# Patient Record
Sex: MALE | Race: WHITE | NOT HISPANIC OR LATINO | Employment: UNEMPLOYED | URBAN - METROPOLITAN AREA
[De-identification: names, ages, dates, MRNs, and addresses within clinical notes are randomized per-mention and may not be internally consistent; named-entity substitution may affect disease eponyms.]

---

## 2022-10-07 ENCOUNTER — TELEPHONE (OUTPATIENT)
Dept: ORTHOPEDICS | Facility: CLINIC | Age: 52
End: 2022-10-07
Payer: COMMERCIAL

## 2022-10-07 ENCOUNTER — HOSPITAL ENCOUNTER (EMERGENCY)
Facility: HOSPITAL | Age: 52
Discharge: HOME | End: 2022-10-07
Attending: EMERGENCY MEDICINE
Payer: COMMERCIAL

## 2022-10-07 VITALS
TEMPERATURE: 98 F | SYSTOLIC BLOOD PRESSURE: 125 MMHG | DIASTOLIC BLOOD PRESSURE: 70 MMHG | HEART RATE: 67 BPM | RESPIRATION RATE: 16 BRPM | OXYGEN SATURATION: 98 %

## 2022-10-07 DIAGNOSIS — S61.329A: Primary | ICD-10-CM

## 2022-10-07 DIAGNOSIS — S62.639B OPEN FRACTURE OF TUFT OF DISTAL PHALANX OF FINGER: ICD-10-CM

## 2022-10-07 PROBLEM — S61.319A NAILBED LACERATION, FINGER, INITIAL ENCOUNTER: Status: ACTIVE | Noted: 2022-10-07

## 2022-10-07 LAB
HCV AB SERPL QL IA: REACTIVE
HIV 1+2 AB+HIV1 P24 AG SERPL QL IA: NORMAL

## 2022-10-07 PROCEDURE — 63600175 PHARM REV CODE 636 W HCPCS: Performed by: EMERGENCY MEDICINE

## 2022-10-07 PROCEDURE — 96366 THER/PROPH/DIAG IV INF ADDON: CPT | Mod: 59

## 2022-10-07 PROCEDURE — 63600175 PHARM REV CODE 636 W HCPCS

## 2022-10-07 PROCEDURE — 11760 PR RECONSTRUC OF NAIL BED: ICD-10-PCS | Mod: F7,,, | Performed by: ORTHOPAEDIC SURGERY

## 2022-10-07 PROCEDURE — 96372 THER/PROPH/DIAG INJ SC/IM: CPT | Mod: 59 | Performed by: EMERGENCY MEDICINE

## 2022-10-07 PROCEDURE — 87389 HIV-1 AG W/HIV-1&-2 AB AG IA: CPT

## 2022-10-07 PROCEDURE — 25000003 PHARM REV CODE 250

## 2022-10-07 PROCEDURE — 11760 REPAIR OF NAIL BED: CPT | Mod: F7,,, | Performed by: ORTHOPAEDIC SURGERY

## 2022-10-07 PROCEDURE — 96365 THER/PROPH/DIAG IV INF INIT: CPT | Mod: 59

## 2022-10-07 PROCEDURE — 99285 EMERGENCY DEPT VISIT HI MDM: CPT | Mod: ,,, | Performed by: EMERGENCY MEDICINE

## 2022-10-07 PROCEDURE — 99284 EMERGENCY DEPT VISIT MOD MDM: CPT | Mod: 25

## 2022-10-07 PROCEDURE — 99285 PR EMERGENCY DEPT VISIT,LEVEL V: ICD-10-PCS | Mod: ,,, | Performed by: EMERGENCY MEDICINE

## 2022-10-07 PROCEDURE — 11760 REPAIR OF NAIL BED: CPT | Mod: F2

## 2022-10-07 PROCEDURE — 99283 EMERGENCY DEPT VISIT LOW MDM: CPT | Mod: 25,,, | Performed by: ORTHOPAEDIC SURGERY

## 2022-10-07 PROCEDURE — 86803 HEPATITIS C AB TEST: CPT

## 2022-10-07 PROCEDURE — 87522 HEPATITIS C REVRS TRNSCRPJ: CPT

## 2022-10-07 PROCEDURE — 87522 HEPATITIS C REVRS TRNSCRPJ: CPT | Mod: 91

## 2022-10-07 PROCEDURE — 96367 TX/PROPH/DG ADDL SEQ IV INF: CPT

## 2022-10-07 PROCEDURE — 99283 PR EMERGENCY DEPT VISIT,LEVEL III: ICD-10-PCS | Mod: 25,,, | Performed by: ORTHOPAEDIC SURGERY

## 2022-10-07 PROCEDURE — 25000003 PHARM REV CODE 250: Performed by: EMERGENCY MEDICINE

## 2022-10-07 RX ORDER — MORPHINE SULFATE 15 MG/1
15 TABLET ORAL
Status: COMPLETED | OUTPATIENT
Start: 2022-10-07 | End: 2022-10-07

## 2022-10-07 RX ORDER — CEFAZOLIN SODIUM/D5W 2 G/100 ML
2 PLASTIC BAG, INJECTION (ML) INTRAVENOUS ONCE
Status: COMPLETED | OUTPATIENT
Start: 2022-10-07 | End: 2022-10-07

## 2022-10-07 RX ORDER — MORPHINE SULFATE 2 MG/ML
6 INJECTION, SOLUTION INTRAMUSCULAR; INTRAVENOUS
Status: COMPLETED | OUTPATIENT
Start: 2022-10-07 | End: 2022-10-07

## 2022-10-07 RX ORDER — OXYCODONE AND ACETAMINOPHEN 5; 325 MG/1; MG/1
1 TABLET ORAL EVERY 4 HOURS PRN
Qty: 12 TABLET | Refills: 0 | Status: SHIPPED | OUTPATIENT
Start: 2022-10-07 | End: 2022-11-08

## 2022-10-07 RX ORDER — SULFAMETHOXAZOLE AND TRIMETHOPRIM 800; 160 MG/1; MG/1
1 TABLET ORAL 2 TIMES DAILY
Qty: 14 TABLET | Refills: 0 | Status: SHIPPED | OUTPATIENT
Start: 2022-10-07 | End: 2022-10-14

## 2022-10-07 RX ORDER — IBUPROFEN 400 MG/1
400 TABLET ORAL
Status: COMPLETED | OUTPATIENT
Start: 2022-10-07 | End: 2022-10-07

## 2022-10-07 RX ADMIN — MORPHINE SULFATE 6 MG: 2 INJECTION, SOLUTION INTRAMUSCULAR; INTRAVENOUS at 09:10

## 2022-10-07 RX ADMIN — IBUPROFEN 400 MG: 400 TABLET, FILM COATED ORAL at 09:10

## 2022-10-07 RX ADMIN — DEXTROSE 2 G: 50 INJECTION, SOLUTION INTRAVENOUS at 12:10

## 2022-10-07 RX ADMIN — MORPHINE SULFATE 15 MG: 15 TABLET ORAL at 10:10

## 2022-10-07 RX ADMIN — VANCOMYCIN HYDROCHLORIDE 1500 MG: 1.5 INJECTION, POWDER, LYOPHILIZED, FOR SOLUTION INTRAVENOUS at 01:10

## 2022-10-07 NOTE — ED TRIAGE NOTES
"29 " PTA cut 3rd , 4th, 5th left hand fingers w/ a skill saw. TIPs of fingers cut.States tetanus is UTD.  Bleeding controlled.    "

## 2022-10-07 NOTE — PROCEDURES
Robert Dial is a 51 y.o. male patient.    Temp: 97.8 °F (36.6 °C) (10/07/22 1454)  Pulse: 67 (10/07/22 1454)  Resp: 16 (10/07/22 1454)  BP: 125/70 (10/07/22 1454)  SpO2: 98 % (10/07/22 1454)       Orthopedic Injury Treatment    Date/Time: 10/7/2022 1:44 PM  Performed by: Carl Carias MD  Authorized by: Carl Carias MD     Location procedure was performed:  [facility]  Assisting Provider:  Cyril Fisher MD  Pre-operative diagnosis:  Nail bed injury left small finger  Post-operative diagnosis:  Nail bed laceration left small finger  Consent Done?:  Yes  Universal Protocol:     Verbal consent obtained?: Yes      Risks and benefits: Risks, benefits and alternatives were discussed      Patient states understanding of procedure being performed: Yes      Patient's understanding of procedure matches consent: Yes      Procedure consent matches procedure scheduled: Yes      Relevant documents present and verified: Yes      Test results available and properly labeled: Yes      Site marked: Yes      Imaging studies available: Yes      Time Out: Immediately prior to the procedure a time out was called    Injury:     Injury location:  Finger    Location details:  Left long finger      Pre-procedure assessment:     Distal perfusion: normal      Neurological function: normal      Range of motion: normal      Local anesthesia used?: Yes      Anesthesia:  Digital block    Local anesthetic:  Lidocaine 1% without epinephrine    Patient sedated?: No        Selections made in this section will also lock the Injury type section above.:     Technical Procedures Used:  Nailbed repair    Complications: No      Estimated blood loss (mL):  5    Specimens: No      Implants: No    Post-procedure assessment:     Neurovascular status: Neurovascularly intact      Distal perfusion: normal      Neurological function: normal      Range of motion: normal      Patient tolerance:  Patient tolerated the procedure well  with no immediate complications  Orthopedic Injury Treatment    Date/Time: 10/7/2022 1:46 PM  Performed by: Cyril Fisher MD  Authorized by: Cyril Fisher MD     Location procedure was performed:  [facility]  Injury:     Injury location:  Finger    Location details:  Left ring finger    Injury type:  Soft tissue      Pre-procedure assessment:     Neurovascular status: Neurovascularly intact      Distal perfusion: normal      Neurological function: normal      Range of motion: normal      Local anesthesia used?: Yes      Anesthesia:  Digital block    Local anesthetic:  Lidocaine 1% without epinephrine      Selections made in this section will also lock the Injury type section above.:     Technical Procedures Used:  Nailbed repair    Complications: No    Post-procedure assessment:     Neurovascular status: Neurovascularly intact      Distal perfusion: normal      Neurological function: normal      Range of motion: normal      Patient tolerance:  Patient tolerated the procedure well with no immediate complications  Orthopedic Injury Treatment    Date/Time: 10/7/2022 1:48 PM  Performed by: Cyril Fisher MD  Authorized by: Cyril Fisher MD     Location procedure was performed:  [facility]  Injury:     Injury location:  Finger    Location details:  Left little finger    Injury type:  Soft tissue      Pre-procedure assessment:     Neurovascular status: Neurovascularly intact      Distal perfusion: normal      Neurological function: normal      Range of motion: normal      Local anesthesia used?: Yes      Local anesthetic:  Lidocaine 1% without epinephrine      Selections made in this section will also lock the Injury type section above.:     Technical Procedures Used:  Nailbed repair    10/10/2022        =================    I was present or immediately available for all critical portions of procedure     Left hand versus saw   Middle finger open tuft fracture with nail bed laceration   Ring  finger with nail bed laceration   Small finger open tuft fracture with nail bed laceration    Patient went debridement irrigation of open fracture + nail bed repair x 3 fingers.    Nailbed laceration, finger, initial encounter  Robert Dail is a LHD 51 y.o. male with no significant PMH presenting with lacerations, nailbed injuries of 3, 4, 5th fingers following a skillsaw injury. Also with associated 3rd, 5th finger tuft fracture. He is neurovascularly intact. No tendon involvement appreciated.     Procedure Note: 3rd, 4th, 5th finger nail bed repair  Patient was explained risks, benefits, and alternatives to treatment and verbalized consent to proceed. Time out was performed and patient name, , site, and procedure were confirmed. 15 cc of 1% lidocaine was injected for a digital block after local cleaning with alcohol swabs. Distal extremity was cleansed with betadine and draped with blue towels. Nails were removed from nail bed using hemostat. The lacerations were then thoroughly irrigated with 4L of normal saline and betadine. At this point, the 3rd, 4th, 5th finger nailbed wounds were primarily closed using 4-0 gut chromic. The nailbed eponychial folds were stented open with xeroform. 3-0 nylon simple sutures were used to repair adjacent skin lacerations. The patient tolerated the procedure well with no complications.  Blood loss was minimal.     - Xeroform covering superficial skin tears  - 3rd, 4th, 5th fingers dressed with soft dressing  - NWB LUE  - Ancef and vanc given in ED  - Tetanus UTD  - Discharge with PO Bactrim  - Pain control multimodal  - [facility] in 1 week (patient will be contacted with appointment details)

## 2022-10-07 NOTE — CONSULTS
[facility] Emergency Dept  Orthopedics  Consult Note    Patient Name: Robert Dial  MRN: [id number]  Admission Date: 10/7/2022  Hospital Length of Stay: 0 days  Attending Provider: Madisyn Dolan MD  Primary Care Provider: Primary Doctor No        Inpatient consult to Orthopedic Surgery  Consult performed by: Cyril Fisher MD  Consult ordered by: Madisyn Dolan MD        Subjective:     Principal Problem:Nailbed laceration, finger, initial encounter    Chief Complaint:   Chief Complaint   Patient presents with    Laceration     Left hand injury while at work - cut his hand with a skill saw - laceration noted clean gauze placed to middle digit         HPI: 51 y.o. male with no significant past medical history who presents to the ED with a complaint of left hand injury this morning. The patient was at work when he cut middle, ring, and small fingers on his left hand with a Skill saw. Denies numbness and tingling. He is up to date with his tetanus shot. Received ancef and vanc in the ED. The patient is left handed.      History reviewed. No pertinent past medical history.    History reviewed. No pertinent surgical history.    Review of patient's allergies indicates:  No Known Allergies    Current Facility-Administered Medications   Medication    vancomycin 1.5 g in dextrose 5 % 250 mL IVPB (ready to mix)     Current Outpatient Medications   Medication Sig    sulfamethoxazole-trimethoprim 800-160mg (BACTRIM DS) 800-160 mg Tab Take 1 tablet by mouth 2 (two) times daily. for 7 days     Family History    None       Tobacco Use    Smoking status: Every Day     Types: Cigarettes    Smokeless tobacco: Never   Substance and Sexual Activity    Alcohol use: Never    Drug use: Never    Sexual activity: Not on file     ROS  Constitutional: negative for fevers  Eyes: negative visual changes  ENT: negative for hearing loss  Respiratory: negative for dyspnea  Cardiovascular: negative for chest pain  Gastrointestinal:  negative for abdominal pain  Genitourinary: negative for dysuria  Neurological: negative for headaches  Behavioral/Psych: negative for hallucinations  Endocrine: negative for temperature intolerance    Objective:     Vital Signs (Most Recent):  Temp: 98 °F (36.7 °C) (10/07/22 1020)  Pulse: 66 (10/07/22 1020)  Resp: 18 (10/07/22 1020)  BP: 135/75 (10/07/22 1020)  SpO2: 100 % (10/07/22 0916) Vital Signs (24h Range):  Temp:  [98 °F (36.7 °C)] 98 °F (36.7 °C)  Pulse:  [66-82] 66  Resp:  [18-22] 18  SpO2:  [100 %] 100 %  BP: (118-135)/(66-75) 135/75           There is no height or weight on file to calculate BMI.      Intake/Output Summary (Last 24 hours) at 10/7/2022 1317  Last data filed at 10/7/2022 1309  Gross per 24 hour   Intake 100 ml   Output --   Net 100 ml       Ortho/SPM Exam  General:  no acute distress, appears stated age   Neuro: alert and oriented x3  Psych: normal mood  Head: normocephalic, atraumatic.  Eyes: no scleral icterus  Mouth: moist mucous membranes  Cardiovascular: extremities warm and well perfused  Lungs: breathing comfortably, equal chest rise bilat  Skin: clean, dry, intact (any exceptions noted in below musculoskeletal exam)    MSK:  RUE:  - Skin intact throughout, no open wounds  - No swelling  - No ecchymosis, erythema, or signs of cellulitis  - NonTTP throughout  - AROM and PROM of the shoulder, elbow, wrist, and hand intact without pain  - Axillary/AIN/PIN/Radial/Median/Ulnar Nerves assessed in isolation without deficit  - SILT throughout  - Compartments soft  - Radial artery palpated   - Capillary Refill <3s    LUE:  - Lacerations through 3, 4, 5 P3 with associated nailbed injuries  - No swelling  - No ecchymosis, erythema, or signs of cellulitis  - NonTTP throughout  - AROM and PROM of the shoulder, elbow, wrist, and hand intact without pain  - Full flexion/extension at DIP  - Axillary/AIN/PIN/Radial/Median/Ulnar Nerves assessed in isolation without deficit  - SILT throughout  -  Compartments soft  - Radial artery palpated   - Capillary Refill <3s    RLE:  - Skin intact throughout, no open wounds  - No swelling  - No ecchymosis, erythema, or signs of cellulitis  - NonTTP throughout  - AROM and PROM of the hip, knee, ankle, and foot intact without pain  - TA/EHL/Gastroc/FHL assessed in isolation without deficit  - SILT throughout  - Compartments soft  - DP and PT palpated  - Capillary Refill <3s  - Negative Log roll  - Negative [facility]    LLE:  - Skin intact throughout, no open wounds  - No swelling  - No ecchymosis, erythema, or signs of cellulitis  - NonTTP throughout  - AROM and PROM of the hip, knee, ankle, and foot intact without pain  - TA/EHL/Gastroc/FHL assessed in isolation without deficit  - SILT throughout  - Compartments soft  - DP and PT palpated  - Capillary Refill <3s  - Negative Log roll  - Negative [facility]        Significant Labs: None    Significant Imaging: I have reviewed and interpreted all pertinent imaging results/findings.  Tuft fracture of 3rd and 5th fingers. Soft tissue defects fingers 3, 4, 5    Assessment/Plan:     * Nailbed laceration, finger, initial encounter  Robert Dial is a LHD 51 y.o. male with no significant PMH presenting with lacerations, nailbed injuries of 3, 4, 5th fingers following a skillsaw injury. Also with associated 3rd, 5th finger tuft fracture. He is neurovascularly intact. No tendon involvement appreciated.    Procedure Note: 3rd, 4th, 5th finger nail bed repair  Patient was explained risks, benefits, and alternatives to treatment and verbalized consent to proceed. Time out was performed and patient name, , site, and procedure were confirmed. 15 cc of 1% lidocaine was injected for a digital block after local cleaning with alcohol swabs. Distal extremity was cleansed with betadine and draped with blue towels. Nails were removed from nail bed using hemostat. The lacerations were then thoroughly irrigated with 4L of normal saline  and betadine. At this point, the 3rd, 4th, 5th finger nailbed wounds were primarily closed using 4-0 gut chromic. The nailbed eponychial folds were stented open with xeroform. 3-0 nylon simple sutures were used to repair adjacent skin lacerations. The patient tolerated the procedure well with no complications.  Blood loss was minimal.    - Xeroform covering superficial skin tears  - 3rd, 4th, 5th fingers dressed with soft dressing  - NWB LUE  - Ancef and vanc given in ED  - Tetanus UTD  - Discharge with PO Bactrim  - Pain control multimodal  - [facility]-up with [facility] in 1 week (patient will be contacted with appointment details)          Cyril Fisher MD  Orthopedics  [facility] - [facility] Dept

## 2022-10-07 NOTE — SUBJECTIVE & OBJECTIVE
History reviewed. No pertinent past medical history.    History reviewed. No pertinent surgical history.    Review of patient's allergies indicates:  No Known Allergies    Current Facility-Administered Medications   Medication    vancomycin 1.5 g in dextrose 5 % 250 mL IVPB (ready to mix)     Current Outpatient Medications   Medication Sig    sulfamethoxazole-trimethoprim 800-160mg (BACTRIM DS) 800-160 mg Tab Take 1 tablet by mouth 2 (two) times daily. for 7 days     Family History    None       Tobacco Use    Smoking status: Every Day     Types: Cigarettes    Smokeless tobacco: Never   Substance and Sexual Activity    Alcohol use: Never    Drug use: Never    Sexual activity: Not on file     ROS  Constitutional: negative for fevers  Eyes: negative visual changes  ENT: negative for hearing loss  Respiratory: negative for dyspnea  Cardiovascular: negative for chest pain  Gastrointestinal: negative for abdominal pain  Genitourinary: negative for dysuria  Neurological: negative for headaches  Behavioral/Psych: negative for hallucinations  Endocrine: negative for temperature intolerance    Objective:     Vital Signs (Most Recent):  Temp: 98 °F (36.7 °C) (10/07/22 1020)  Pulse: 66 (10/07/22 1020)  Resp: 18 (10/07/22 1020)  BP: 135/75 (10/07/22 1020)  SpO2: 100 % (10/07/22 0916) Vital Signs (24h Range):  Temp:  [98 °F (36.7 °C)] 98 °F (36.7 °C)  Pulse:  [66-82] 66  Resp:  [18-22] 18  SpO2:  [100 %] 100 %  BP: (118-135)/(66-75) 135/75           There is no height or weight on file to calculate BMI.      Intake/Output Summary (Last 24 hours) at 10/7/2022 1317  Last data filed at 10/7/2022 1309  Gross per 24 hour   Intake 100 ml   Output --   Net 100 ml       Ortho/SPM Exam  General:  no acute distress, appears stated age   Neuro: alert and oriented x3  Psych: normal mood  Head: normocephalic, atraumatic.  Eyes: no scleral icterus  Mouth: moist mucous membranes  Cardiovascular: extremities warm and well perfused  Lungs:  breathing comfortably, equal chest rise bilat  Skin: clean, dry, intact (any exceptions noted in below musculoskeletal exam)    MSK:  RUE:  - Skin intact throughout, no open wounds  - No swelling  - No ecchymosis, erythema, or signs of cellulitis  - NonTTP throughout  - AROM and PROM of the shoulder, elbow, wrist, and hand intact without pain  - Axillary/AIN/PIN/Radial/Median/Ulnar Nerves assessed in isolation without deficit  - SILT throughout  - Compartments soft  - Radial artery palpated   - Capillary Refill <3s    LUE:  - Lacerations through 3, 4, 5 P3 with associated nailbed injuries  - No swelling  - No ecchymosis, erythema, or signs of cellulitis  - NonTTP throughout  - AROM and PROM of the shoulder, elbow, wrist, and hand intact without pain  - Full flexion/extension at DIP  - Axillary/AIN/PIN/Radial/Median/Ulnar Nerves assessed in isolation without deficit  - SILT throughout  - Compartments soft  - Radial artery palpated   - Capillary Refill <3s    RLE:  - Skin intact throughout, no open wounds  - No swelling  - No ecchymosis, erythema, or signs of cellulitis  - NonTTP throughout  - AROM and PROM of the hip, knee, ankle, and foot intact without pain  - TA/EHL/Gastroc/FHL assessed in isolation without deficit  - SILT throughout  - Compartments soft  - DP and PT palpated  - Capillary Refill <3s  - Negative Log roll  - Negative [facility]    LLE:  - Skin intact throughout, no open wounds  - No swelling  - No ecchymosis, erythema, or signs of cellulitis  - NonTTP throughout  - AROM and PROM of the hip, knee, ankle, and foot intact without pain  - TA/EHL/Gastroc/FHL assessed in isolation without deficit  - SILT throughout  - Compartments soft  - DP and PT palpated  - Capillary Refill <3s  - Negative Log roll  - Negative [facility]        Significant Labs: None    Significant Imaging: I have reviewed and interpreted all pertinent imaging results/findings.  Tuft fracture of 3rd and 5th fingers. Soft tissue  defects fingers 3, 4, 5

## 2022-10-07 NOTE — ED PROVIDER NOTES
Encounter Date: 10/7/2022    SCRIBE #1 NOTE: I, Stephanie Lucas, am scribing for, and in the presence of,  Madisyn Dolan MD. I have scribed the following portions of the note - Other sections scribed: HPI, ROS.     History     Chief Complaint   Patient presents with    Laceration     Left hand injury while at work - cut his hand with a skill saw - laceration noted clean gauze placed to middle digit      Time patient was seen by the provider: 9:25 AM      The patient is a 51 y.o. male with no significant past medical history who presents to the ED with a complaint of a hand injury onset this morning. The patient was at work when he cut the 3rd, 4th and 5th digits of his left hand with a saw. He is up to date with his tetanus shot. The patient is left handed.  Denies any weakness or numbness to his digits.    The history is provided by the patient and medical records. No  was used.   Review of patient's allergies indicates:  No Known Allergies  History reviewed. No pertinent past medical history.  History reviewed. No pertinent surgical history.  History reviewed. No pertinent family history.  Social History     Tobacco Use    Smoking status: Every Day     Types: Cigarettes    Smokeless tobacco: Never   Substance Use Topics    Alcohol use: Never    Drug use: Never     Review of Systems   Constitutional:  Negative for fever.   Skin:  Positive for wound (left hand). Negative for rash.   Neurological:  Negative for weakness and numbness.   All other systems reviewed and are negative.    Physical Exam     Initial Vitals [10/07/22 0916]   BP Pulse Resp Temp SpO2   118/66 82 (!) 22 98 °F (36.7 °C) 100 %      MAP       --         Physical Exam    Nursing note and vitals reviewed.  Constitutional: Vital signs are normal. He appears well-developed and well-nourished. He is not diaphoretic.  Non-toxic appearance. He does not appear ill. He appears distressed.   HENT:   Head: Normocephalic and atraumatic.    Mouth/Throat: Mucous membranes are normal. Mucous membranes are not dry.   Eyes: Conjunctivae and lids are normal.   Neck: Neck supple.   Normal range of motion.  Pulmonary/Chest: No respiratory distress.   Musculoskeletal:        Hands:       Cervical back: Normal range of motion and neck supple.      Comments: Extensive lacerations noted to patient's left 3rd 4th and 5th digits.  Third digit:  To deep, jagged lacerations to the volar aspect and tip of patient's 3rd digit extending to the nail edge but not involving the nail.  Sensation intact to LT medially and laterally and flexion and extension at distal phalanx intact  Fourth digit:  Laceration extending from middle of the nail and nailbed through the proximal nail fold and scan and the laceration extends dorsally over the distal phalanx.  Ulnar aspect of nail is gone.Sensation intact to LT medially and laterally and flexion and extension at distal phalanx intact  Fifth digit:  Multiple jagged lacerations over volar and dorsal aspect of distal phalanx.  One laceration to extends through the radial aspect of the nail and involves the nail bed with part of the nail itself of avulsed.  Sensation intact to LT medially and laterally and flexion and extension at distal phalanx intact     Neurological: He is alert and oriented to person, place, and time.   Skin: Skin is dry and intact. No pallor.   Psychiatric: He has a normal mood and affect. His speech is normal and behavior is normal.                   ED Course   Procedures  Labs Reviewed   HEPATITIS C ANTIBODY - Abnormal; Notable for the following components:       Result Value    Hepatitis C Ab Reactive (*)     All other components within normal limits    Narrative:     Release to patient->Immediate   HIV 1 / 2 ANTIBODY    Narrative:     Release to patient->Immediate   HEPATITIS C RNA, QUANTITATIVE, PCR          Imaging Results              X-Ray Hand 3 View Left (Final result)  Result time 10/07/22 11:12:17       Final result by Zafar Mondragon MD (10/07/22 11:12:17)                   Impression:      As above.      Electronically signed by: Zafar Mondragon  Date:    10/07/2022  Time:    11:12               Narrative:    EXAMINATION:  XR HAND COMPLETE 3 VIEW LEFT    CLINICAL HISTORY:  lacerations, cut with steel saw;.    TECHNIQUE:  PA, lateral, and oblique views of the left hand were performed.    COMPARISON:  None    FINDINGS:  Recent appear mildly comminuted fractures of the 3rd 5th digit distal phalangeal aziza noted.  There is overlying soft tissue irregularity suggested lacerations.  Small radiodensities in this region could represent retained foreign body/debris.    Degenerative changes are noted at the radiocarpal and thumb CMC and MCP joints.  Less postop findings additionally noted at the 2nd through 4th IP joints.                                       Medications   morphine injection 6 mg (6 mg Intramuscular Given 10/7/22 0940)   ibuprofen tablet 400 mg (400 mg Oral Given 10/7/22 0942)   morphine tablet 15 mg (15 mg Oral Given 10/7/22 1014)   ceFAZolin 2 gram in dextrose 5% 100 mL IVPB (premix) (0 g Intravenous Stopped 10/7/22 1309)   vancomycin 1.5 g in dextrose 5 % 250 mL IVPB (ready to mix) (0 mg Intravenous Stopped 10/7/22 1454)     Medical Decision Making:   History:   Old Medical Records: I decided to obtain old medical records.  Initial Assessment:   Extensive lacerations from a steel saw to patient's left hand, involving the nail, nail beds  Limited exam secondary to severe pain but patient does seem to have intact sensation, motor function and tendon function both with flexion and extension at the distal phalanx per all involved digits  Differential Diagnosis:   Finger lacerations involving nail and nail bed  Rule out open fracture  Rule out foreign body  Clinical Tests:   Radiological Study: Ordered and Reviewed  ED Management:  X-ray  Pain control  Patient reports that his tetanus is  up-to-date  Hand surgery consulted to assist with wound repair and evaluation  Other:   I have discussed this case with another health care provider.       <> Summary of the Discussion: Orthopedic Surgery        Scribe Attestation:   Scribe #1: I performed the above scribed service and the documentation accurately describes the services I performed. I attest to the accuracy of the note.      ED Course as of 10/07/22 1500   Fri Oct 07, 2022   1120 Neck x-ray findings noted.  Patient has fractures of the 3rd, 4th, 5th aziza of distal phalanx.  This suggest open fractures.  Orthopedic surgery has already ordered cefazolin 2 g IV.  In addition the x-ray suggests that there might also be retained foreign bodies.  Will defer to Ortho to address these as they are going to be performing a wound repair on the patient. [AS]   1326 Ortho has completed laceration repairs and are recommending discharge home with a prescription for Bactrim. [AS]      ED Course User Index  [AS] Madisyn Dolan MD                 I, Dr. Madisyn Dolan, personally performed the services described in this documentation. All medical record entries made by the scribe were at my direction and in my presence.  I have reviewed the chart and agree that the record reflects my personal performance and is accurate and complete. Madisyn Dolan MD.  10:59 AM 10/07/2022    Clinical Impression:   Final diagnoses:  [S61.329A] Laceration of finger of left hand with foreign body and damage to nail, unspecified finger, initial encounter (Primary)  [S62.533B] Open fracture of tuft of distal phalanx of finger      ED Disposition Condition    Discharge Stable          ED Prescriptions       Medication Sig Dispense Start Date End Date Auth. Provider    sulfamethoxazole-trimethoprim 800-160mg (BACTRIM DS) 800-160 mg Tab Take 1 tablet by mouth 2 (two) times daily. for 7 days 14 tablet 10/7/2022 10/14/2022 Madisyn Dolan MD    oxyCODONE-acetaminophen (PERCOCET)  5-325 mg per tablet Take 1 tablet by mouth every 4 (four) hours as needed for Pain. 12 tablet 10/7/2022 -- Madisyn Dolan MD          Follow-up Information       Follow up With Specialties Details Why Contact Info Additional Information    [facility] Schedule an appointment as soon as possible for a visit   [address], [address]  [facility] [id number] [phone] [facility], 9th Floor Please park in [address] and use [address] elevators             Madisyn Dolan MD  10/07/22 6210

## 2022-10-07 NOTE — ED NOTES
LOC: The patient is awake and alert; oriented x 3 and speaking appropriately.  APPEARANCE: Patient resting comfortably, patient is clean and well groomed  SKIN: warm and dry, normal skin turgor & moist mucus membranes, skin intact, no breakdown noted. Tips of 3rd, 4th, 5th left fingers cut w/ a skill saw  MUSCULOSKELETAL: Patient moving all extremities well, no obvious swelling or deformities noted  RESPIRATORY: Airway is open and patent, respirations are spontaneous, normal effort and rate  CARDIAC: Patient has a normal rate, no peripheral edema noted, capillary refill < 3 seconds; No complaints of chest pain   ABDOMEN: Soft and non tender to palpation, no distention noted.

## 2022-10-07 NOTE — ASSESSMENT & PLAN NOTE
Robert Dial is a D 51 y.o. male with no significant PMH presenting with lacerations, nailbed injuries of 3, 4, 5th fingers following a skillsaw injury. Also with associated 3rd, 5th finger tuft fracture. He is neurovascularly intact. No tendon involvement appreciated.    Procedure Note: 3rd, 4th, 5th finger nail bed repair  Patient was explained risks, benefits, and alternatives to treatment and verbalized consent to proceed. Time out was performed and patient name, , site, and procedure were confirmed. 15 cc of 1% lidocaine was injected for a digital block after local cleaning with alcohol swabs. Distal extremity was cleansed with betadine and draped with blue towels. Nails were removed from nail bed using hemostat. The lacerations were then thoroughly irrigated with 4L of normal saline and betadine. At this point, the 3rd, 4th, 5th finger nailbed wounds were primarily closed using 4-0 gut chromic. The nailbed eponychial folds were stented open with xeroform. 3-0 nylon simple sutures were used to repair adjacent skin lacerations. The patient tolerated the procedure well with no complications.  Blood loss was minimal.    - Xeroform covering superficial skin tears  - 3rd, 4th, 5th fingers dressed with soft dressing  - NWB LUE  - Ancef and vanc given in ED  - Tetanus UTD  - Discharge with PO Bactrim  - Pain control multimodal  - Follow-up with [facility] in 1 week (patient will be contacted with appointment details)

## 2022-10-07 NOTE — TELEPHONE ENCOUNTER
Spoke c pts wife. Offered and confirmed 8:30 a.m. appt with April Blakely PA-C 10/10/22. Pts wife stated he has Medicare coverage and will bring the card with him when he comes/call us before if able.     Patient verbalized understanding and was thankful.       Called pts wife back to discuss WC options, should he be filing a claim.

## 2022-10-07 NOTE — TELEPHONE ENCOUNTER
Spoke c pts wife who stated that he was not planning to file for WC for this injruy. Patients wife  verbalized understanding and was thankful.

## 2022-10-07 NOTE — HPI
51 y.o. male with no significant past medical history who presents to the ED with a complaint of left hand injury this morning. The patient was at work when he cut middle, ring, and small fingers on his left hand with a Skill saw. Denies numbness and tingling. He is up to date with his tetanus shot. Received ancef and vanc in the ED. The patient is left handed.

## 2022-10-08 LAB — HCV RNA SERPL NAA+PROBE-ACNC: ABNORMAL IU/ML

## 2022-10-10 ENCOUNTER — DOCUMENTATION ONLY (OUTPATIENT)
Dept: ORTHOPEDICS | Facility: CLINIC | Age: 52
End: 2022-10-10
Payer: COMMERCIAL

## 2022-10-13 DIAGNOSIS — B19.20 HEPATITIS C VIRUS INFECTION WITHOUT HEPATIC COMA, UNSPECIFIED CHRONICITY: Primary | ICD-10-CM

## 2022-10-14 ENCOUNTER — TELEPHONE (OUTPATIENT)
Dept: HEPATOLOGY | Facility: CLINIC | Age: 52
End: 2022-10-14
Payer: COMMERCIAL

## 2022-10-14 NOTE — TELEPHONE ENCOUNTER
Anastasiya Meza PA-C ordered that patient be scheduled for a hep c consult visit.  Patient quant positive.  Attempt made to reach him for scheduling.  Unable to LVM so letter sent ([facility]) asking that he contact hepatology for scheduling.

## 2022-11-08 ENCOUNTER — HOSPITAL ENCOUNTER (EMERGENCY)
Facility: HOSPITAL | Age: 52
Discharge: HOME | End: 2022-11-08
Attending: EMERGENCY MEDICINE
Payer: COMMERCIAL

## 2022-11-08 ENCOUNTER — TELEPHONE (OUTPATIENT)
Dept: ORTHOPEDICS | Facility: CLINIC | Age: 52
End: 2022-11-08
Payer: COMMERCIAL

## 2022-11-08 VITALS
TEMPERATURE: 98 F | WEIGHT: 170 LBS | RESPIRATION RATE: 18 BRPM | SYSTOLIC BLOOD PRESSURE: 139 MMHG | OXYGEN SATURATION: 95 % | HEIGHT: 70 IN | DIASTOLIC BLOOD PRESSURE: 83 MMHG | HEART RATE: 84 BPM | BODY MASS INDEX: 24.34 KG/M2

## 2022-11-08 DIAGNOSIS — M25.519 SHOULDER PAIN: ICD-10-CM
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
## 2022-11-08 DIAGNOSIS — S16.1XXA STRAIN OF NECK MUSCLE, INITIAL ENCOUNTER: ICD-10-CM

## 2022-11-08 PROCEDURE — 99284 EMERGENCY DEPT VISIT MOD MDM: CPT | Mod: 25

## 2022-11-08 PROCEDURE — 96372 THER/PROPH/DIAG INJ SC/IM: CPT | Performed by: NURSE PRACTITIONER

## 2022-11-08 PROCEDURE — 73030 X-RAY EXAM OF SHOULDER: CPT | Mod: 26,RT,, | Performed by: RADIOLOGY

## 2022-11-08 PROCEDURE — 72040 X-RAY EXAM NECK SPINE 2-3 VW: CPT | Mod: 26,,, | Performed by: RADIOLOGY

## 2022-11-08 PROCEDURE — 73030 X-RAY EXAM OF SHOULDER: CPT | Mod: TC,RT

## 2022-11-08 PROCEDURE — 63600175 PHARM REV CODE 636 W HCPCS: Performed by: NURSE PRACTITIONER

## 2022-11-08 PROCEDURE — 72040 XR CERVICAL SPINE AP LATERAL: ICD-10-PCS | Mod: 26,,, | Performed by: RADIOLOGY

## 2022-11-08 PROCEDURE — 73030 XR SHOULDER COMPLETE 2 OR MORE VIEWS RIGHT: ICD-10-PCS | Mod: 26,RT,, | Performed by: RADIOLOGY

## 2022-11-08 PROCEDURE — 72040 X-RAY EXAM NECK SPINE 2-3 VW: CPT | Mod: TC

## 2022-11-08 RX ORDER — METHOCARBAMOL 750 MG/1
750 TABLET, FILM COATED ORAL EVERY 8 HOURS PRN
Qty: 12 TABLET | Refills: 0 | Status: SHIPPED | OUTPATIENT
Start: 2022-11-08

## 2022-11-08 RX ORDER — HYDROCODONE BITARTRATE AND ACETAMINOPHEN 7.5; 325 MG/1; MG/1
1 TABLET ORAL EVERY 8 HOURS PRN
Qty: 10 TABLET | Refills: 0 | Status: SHIPPED | OUTPATIENT
Start: 2022-11-08

## 2022-11-08 RX ORDER — ORPHENADRINE CITRATE 30 MG/ML
30 INJECTION INTRAMUSCULAR; INTRAVENOUS
Status: COMPLETED | OUTPATIENT
Start: 2022-11-08 | End: 2022-11-08

## 2022-11-08 RX ADMIN — ORPHENADRINE CITRATE 30 MG: 30 INJECTION INTRAMUSCULAR; INTRAVENOUS at 11:11

## 2022-11-08 NOTE — DISCHARGE INSTRUCTIONS
Take medications as prescribed. Follow-up with your PCP and orthopedics as discussed. Return to the ED for any vomiting, worsening headache, lethargy, or any worsening symptoms or concerns at all.
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
## 2022-11-09 ENCOUNTER — HOSPITAL ENCOUNTER (EMERGENCY)
Facility: HOSPITAL | Age: 52
Discharge: HOME | End: 2022-11-09
Attending: EMERGENCY MEDICINE
Payer: COMMERCIAL

## 2022-11-09 VITALS
TEMPERATURE: 98 F | HEART RATE: 92 BPM | OXYGEN SATURATION: 96 % | HEIGHT: 70 IN | DIASTOLIC BLOOD PRESSURE: 74 MMHG | SYSTOLIC BLOOD PRESSURE: 118 MMHG | RESPIRATION RATE: 18 BRPM | BODY MASS INDEX: 24.34 KG/M2 | WEIGHT: 170 LBS

## 2022-11-09 DIAGNOSIS — M50.30 DDD (DEGENERATIVE DISC DISEASE), CERVICAL: ICD-10-CM

## 2022-11-09 DIAGNOSIS — S16.1XXA STRAIN OF NECK MUSCLE, INITIAL ENCOUNTER: ICD-10-CM
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
## 2022-11-09 DIAGNOSIS — M54.12 RADICULOPATHY, CERVICAL: Primary | ICD-10-CM

## 2022-11-09 PROCEDURE — 99284 EMERGENCY DEPT VISIT MOD MDM: CPT | Mod: 25

## 2022-11-09 PROCEDURE — 72125 CT NECK SPINE W/O DYE: CPT | Mod: 26,,, | Performed by: RADIOLOGY

## 2022-11-09 PROCEDURE — 72125 CT NECK SPINE W/O DYE: CPT | Mod: TC

## 2022-11-09 PROCEDURE — 72125 CT CERVICAL SPINE WITHOUT CONTRAST: ICD-10-PCS | Mod: 26,,, | Performed by: RADIOLOGY

## 2022-11-09 NOTE — DISCHARGE INSTRUCTIONS
Follow neck stretch instruction, follow up with physical therapy. Return for any worsening or new symptoms. Follow up with Primary Care Provider in the next 2-3 days.

## 2022-11-10 ENCOUNTER — TELEPHONE (OUTPATIENT)
Dept: ORTHOPEDICS | Facility: CLINIC | Age: 52
End: 2022-11-10
Payer: COMMERCIAL
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
## 2022-11-10 NOTE — TELEPHONE ENCOUNTER
Returned call. LVM for patient to call back to be scheduled.    ----- Message from Moncho Begum sent at 11/10/2022  9:24 AM CST -----  Regarding: [facility] ED referral from 11/09, hany f/u appt [phone]  Contact: pt   [facility] ED referral from 11/09, hany f/pb appt

## 2022-11-11 ENCOUNTER — CLINICAL SUPPORT (OUTPATIENT)
Dept: REHABILITATION | Facility: HOSPITAL | Age: 52
End: 2022-11-11
Payer: COMMERCIAL

## 2022-11-11 DIAGNOSIS — M54.12 RADICULOPATHY, CERVICAL: ICD-10-CM

## 2022-11-11 PROCEDURE — 97161 PT EVAL LOW COMPLEX 20 MIN: CPT

## 2022-11-11 PROCEDURE — 97110 THERAPEUTIC EXERCISES: CPT
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
## 2022-11-25 ENCOUNTER — CLINICAL SUPPORT (OUTPATIENT)
Dept: REHABILITATION | Facility: HOSPITAL | Age: 52
End: 2022-11-25
Payer: COMMERCIAL

## 2022-11-25 DIAGNOSIS — M54.12 RADICULOPATHY, CERVICAL: Primary | ICD-10-CM

## 2022-11-25 PROCEDURE — 97110 THERAPEUTIC EXERCISES: CPT

## 2022-11-25 NOTE — PROGRESS NOTES
"                                                    Physical Therapy Daily Note     Name: Robert Dial  Clinic Number: [id number]  Diagnosis:   Encounter Diagnosis   Name Primary?    Radiculopathy, cervical Yes     Physician: Melissa Perez NP  Precautions: none   Visit #: 2 of 12  PTA Visit #: 0  Time In: 200  Time Out: 240    Subjective     Pt reports: states he is about the same co "crick in neck"  Complains of numbness and tingling R UE   Pain Scale: Robert rates pain on a scale of 0-10 to be 7 currently.    Objective     Robert received individual therapeutic exercises to develop strength, endurance, ROM, flexibility, and posture for 38 minutes including:  Nustep x 10  Shoulder ext RTB x 40  Rows x 40 RTB  Shrugs 7# x 40  Grounders x 40 RTB  Shoulder abd 2# x 40  T's YTB x 40  W's YRB x 40  SB roll ups x 40  PNF YTB x 40        Written Home Exercises Provided: reviewed with patient and is  compliant   Pt demo good understanding of the education provided. Robert demonstrated good return demonstration of activities.     Education provided re:  Robert verbalized good understanding of education provided.   No spiritual or educational barriers to learning provided    Assessment     Patient tolerated exercises well with no adverse side effects.  Continued to have some pain and paraesthesias after his treatment    This is a 52 y.o. male referred to outpatient physical therapy and presents with a medical diagnosis of neck and R shoulder pain  and demonstrates limitations as described in the problem list. Pt prognosis is Good. Pt will continue to benefit from skilled outpatient physical therapy to address the deficits listed in the problem list, provide pt/family education and to maximize pt's level of independence in the home and community environment.     Goals as follows:     Short Term GOALS: 3 weeks. Pt agrees with goals set.  1. Patient demonstrates independence with HEP.   2. Patient demonstrates independence with " Postural Awareness.   3. Patient demonstrates independence with body mechanics.      Long Term GOALS: 6 weeks. Pt agrees with goals set.  1. Patient demonstrates increased endurance  to improve tolerance to functional activities.   2. Patient demonstrates increased strength BUE's to 5/5 or greater to improve tolerance to functional activities.   3. Patient demonstrates improved overall function per NDI to 10% or less.         Plan     Continue with established Plan of Care towards PT goals.    Therapist: Elena Edge, PT  11/25/2022

## 2022-12-05 ENCOUNTER — HOSPITAL ENCOUNTER (EMERGENCY)
Facility: HOSPITAL | Age: 52
Discharge: HOME | End: 2022-12-05
Payer: COMMERCIAL

## 2022-12-05 VITALS
HEART RATE: 98 BPM | OXYGEN SATURATION: 95 % | DIASTOLIC BLOOD PRESSURE: 88 MMHG | BODY MASS INDEX: 24.34 KG/M2 | RESPIRATION RATE: 19 BRPM | TEMPERATURE: 98 F | WEIGHT: 170 LBS | HEIGHT: 70 IN | SYSTOLIC BLOOD PRESSURE: 130 MMHG

## 2022-12-05 DIAGNOSIS — S61.216A LACERATION OF RIGHT LITTLE FINGER WITHOUT FOREIGN BODY WITHOUT DAMAGE TO NAIL, INITIAL ENCOUNTER: Primary | ICD-10-CM

## 2022-12-05 PROCEDURE — 25000003 PHARM REV CODE 250: Performed by: NURSE PRACTITIONER

## 2022-12-05 PROCEDURE — 99283 EMERGENCY DEPT VISIT LOW MDM: CPT | Mod: 25

## 2022-12-05 PROCEDURE — 12001 RPR S/N/AX/GEN/TRNK 2.5CM/<: CPT

## 2022-12-05 PROCEDURE — 73140 XR FINGER 2 OR MORE VIEWS RIGHT: ICD-10-PCS | Mod: 26,RT,, | Performed by: RADIOLOGY

## 2022-12-05 PROCEDURE — 73140 X-RAY EXAM OF FINGER(S): CPT | Mod: 26,RT,, | Performed by: RADIOLOGY

## 2022-12-05 PROCEDURE — 73140 X-RAY EXAM OF FINGER(S): CPT | Mod: TC,RT

## 2022-12-05 RX ORDER — LIDOCAINE HYDROCHLORIDE 10 MG/ML
5 INJECTION, SOLUTION EPIDURAL; INFILTRATION; INTRACAUDAL; PERINEURAL
Status: COMPLETED | OUTPATIENT
Start: 2022-12-05 | End: 2022-12-05

## 2022-12-05 RX ADMIN — LIDOCAINE HYDROCHLORIDE 50 MG: 10 INJECTION, SOLUTION EPIDURAL; INFILTRATION; INTRACAUDAL; PERINEURAL at 11:12

## 2022-12-05 RX ADMIN — BACITRACIN ZINC, NEOMYCIN, POLYMYXIN B 1 EACH: 400; 3.5; 5 OINTMENT TOPICAL at 11:12

## 2022-12-05 NOTE — ED PROVIDER NOTES
Encounter Date: 12/5/2022       History     Chief Complaint   Patient presents with    Laceration     Patient reports laceration to the bend of his right pinky finger with skill saw. Patient has finger wrapped and is holding pressure      Patient 52-year-old male presents emergency room laceration to the 5th finger.  Patient states he got it caught on a skill saw.  Bleeding is controlled.  Patient states tetanus shot is up-to-date.  He denies all other complaints this time.    Review of patient's allergies indicates:  No Known Allergies  History reviewed. No pertinent past medical history.  History reviewed. No pertinent surgical history.  History reviewed. No pertinent family history.  Social History     Tobacco Use    Smoking status: Every Day     Types: Cigarettes    Smokeless tobacco: Never   Substance Use Topics    Alcohol use: Never    Drug use: Never     Review of Systems   Constitutional: Negative.    HENT: Negative.     Eyes: Negative.    Respiratory: Negative.     Cardiovascular: Negative.    Gastrointestinal: Negative.    Endocrine: Negative.    Genitourinary: Negative.    Musculoskeletal: Negative.    Skin:  Positive for wound (Laceration anterior side 5th finger).   Allergic/Immunologic: Negative for food allergies.   Neurological: Negative.    Hematological: Negative.    Psychiatric/Behavioral: Negative.     All other systems reviewed and are negative.    Physical Exam     Initial Vitals   BP Pulse Resp Temp SpO2   12/05/22 0953 12/05/22 0951 12/05/22 0951 12/05/22 0953 12/05/22 0951   130/88 98 19 98.3 °F (36.8 °C) 95 %      MAP       --                Physical Exam    Nursing note and vitals reviewed.  Constitutional: He appears well-developed and well-nourished. He is not diaphoretic. No distress.   HENT:   Head: Normocephalic and atraumatic.   Mouth/Throat: Oropharynx is clear and moist.   Eyes: Pupils are equal, round, and reactive to light.   Neck:   Normal range of motion.  Cardiovascular:   Normal rate, regular rhythm, normal heart sounds and intact distal pulses.           No murmur heard.  Musculoskeletal:         General: Tenderness (To the 5th finger) present. No edema. Normal range of motion.      Cervical back: Normal range of motion.     Neurological: He is alert and oriented to person, place, and time. He has normal strength. GCS score is 15. GCS eye subscore is 4. GCS verbal subscore is 5. GCS motor subscore is 6.   Skin: Skin is warm and dry. Capillary refill takes 2 to 3 seconds.   Proximally 2 cm jagged laceration noted to distal end 5th finger of the right hand.   Psychiatric: He has a normal mood and affect.       ED Course   Lac Repair    Date/Time: 12/5/2022 11:35 AM  Performed by: Van Carbone NP  Authorized by: Van Carbone NP     Consent:     Consent obtained:  Verbal    Consent given by:  Patient    Risks, benefits, and alternatives were discussed: yes      Risks discussed:  Infection, pain, retained foreign body, tendon damage, vascular damage, poor wound healing, poor cosmetic result, need for additional repair and nerve damage    Alternatives discussed:  Delayed treatment, observation, referral and no treatment  Universal protocol:     Procedure explained and questions answered to patient or proxy's satisfaction: yes      Test results available: yes      Imaging studies available: yes      Required blood products, implants, devices, and special equipment available: yes      Site/side marked: yes      Immediately prior to procedure, a time out was called: yes      Patient identity confirmed:  Verbally with patient, arm band and hospital-assigned identification number  Anesthesia:     Anesthesia method:  Nerve block    Block location:  Fifth finger    Block needle gauge:  27 G    Block anesthetic:  Lidocaine 1% w/o epi    Block technique:  Digital block    Block outcome:  Anesthesia achieved  Laceration details:     Location:  Finger    Finger location:  R small finger     Length (cm):  2    Depth (mm):  25  Pre-procedure details:     Preparation:  Patient was prepped and draped in usual sterile fashion and imaging obtained to evaluate for foreign bodies  Exploration:     Hemostasis achieved with:  Direct pressure    Imaging obtained: x-ray      Imaging outcome: foreign body not noted      Wound exploration: wound explored through full range of motion and entire depth of wound visualized      Wound extent: no muscle damage noted, no nerve damage noted, no tendon damage noted and no underlying fracture noted      Contaminated: no    Treatment:     Area cleansed with:  Povidone-iodine    Amount of cleaning:  Standard    Irrigation solution:  Sterile water    Irrigation volume:  20 ml    Irrigation method:  Syringe    Debridement:  None    Undermining:  None    Layers/structures repaired:  Deep dermal/superficial fascia  Skin repair:     Repair method:  Sutures    Suture size:  4-0    Suture material:  Nylon    Suture technique:  Simple interrupted    Number of sutures:  8  Approximation:     Approximation:  Close  Repair type:     Repair type:  Simple  Post-procedure details:     Dressing:  Antibiotic ointment and non-adherent dressing    Procedure completion:  Tolerated well, no immediate complications  Labs Reviewed - No data to display       Imaging Results              X-Ray Finger 2 or More Views Right (Final result)  Result time 12/05/22 10:45:41      Final result by Jake Suárez MD (12/05/22 10:45:41)                   Impression:      Small soft tissue laceration of the 5th digit.      Electronically signed by: Jake Suárez  Date:    12/05/2022  Time:    10:45               Narrative:    EXAMINATION:  XR FINGER 2 OR MORE VIEWS RIGHT    CLINICAL HISTORY:  laceration;    TECHNIQUE:  Three views 5th digit right hand.    COMPARISON:  None    FINDINGS:  Subtle small soft tissue laceration of the distal finger.  No underlying bony fracture.  No radiopaque foreign body.                                     X-Rays:   Independently Interpreted Readings:   Other Readings:  Three views 5th digit right hand.     COMPARISON:  None     FINDINGS:  Subtle small soft tissue laceration of the distal finger.  No underlying bony fracture.  No radiopaque foreign body.     Impression:     Small soft tissue laceration of the 5th digit.     Medications   LIDOcaine (PF) 10 mg/ml (1%) injection 50 mg (has no administration in time range)   neomycin-bacitracnZn-polymyxnB packet (has no administration in time range)     Medical Decision Making:   Initial Assessment:   Patient seen examined emergency room.  Assessment as noted above.  No acute distress at this time.  Differential Diagnosis:   Laceration, fracture, dislocation, tendon injury, nerve injury  Clinical Tests:   Radiological Study: Ordered and Reviewed  ED Management:  After patient was seen examined emergency room.  Right hand was soaked Betadine/saline solution.  X-ray was obtained.  X-ray shows no fracture, foreign body objects.    Digital block was accomplished using lidocaine.  Aid interrupted sutures was used to close laceration.  Antibiotic was applied.  Nonadherent dressing was placed.  Patient has been instructed on monitoring for signs and symptoms of infection.  Patient encouraged to follow-up 7-10 days for suture removal.                        Clinical Impression:   Final diagnoses:  [S61.216A] Laceration of right little finger without foreign body without damage to nail, initial encounter (Primary)      ED Disposition Condition    Discharge Stable          ED Prescriptions    None       Follow-up Information    None          Van Carbone NP  12/05/22 1140

## 2022-12-05 NOTE — DISCHARGE INSTRUCTIONS
Keep lacerated area clean and dry.    Wash with soap water daily.  Pat dry.    Follow-up 17 days for suture removal.    Follow-up if you have any signs symptoms of redness, infection, or drainage.

## 2023-12-09 ENCOUNTER — HOSPITAL ENCOUNTER (EMERGENCY)
Facility: HOSPITAL | Age: 53
Discharge: LEFT BEFORE DISCHARGE | End: 2023-12-09
Attending: STUDENT IN AN ORGANIZED HEALTH CARE EDUCATION/TRAINING PROGRAM

## 2023-12-09 VITALS
RESPIRATION RATE: 20 BRPM | DIASTOLIC BLOOD PRESSURE: 100 MMHG | WEIGHT: 160 LBS | HEIGHT: 70 IN | SYSTOLIC BLOOD PRESSURE: 136 MMHG | TEMPERATURE: 98 F | OXYGEN SATURATION: 98 % | BODY MASS INDEX: 22.9 KG/M2 | HEART RATE: 79 BPM

## 2023-12-09 DIAGNOSIS — R42 DIZZINESS: Primary | ICD-10-CM

## 2023-12-09 PROCEDURE — 99281 EMR DPT VST MAYX REQ PHY/QHP: CPT

## 2023-12-09 RX ORDER — KETOROLAC TROMETHAMINE 30 MG/ML
15 INJECTION, SOLUTION INTRAMUSCULAR; INTRAVENOUS
Status: DISCONTINUED | OUTPATIENT
Start: 2023-12-09 | End: 2023-12-09 | Stop reason: HOSPADM

## 2023-12-09 RX ORDER — ONDANSETRON 2 MG/ML
4 INJECTION INTRAMUSCULAR; INTRAVENOUS
Status: DISCONTINUED | OUTPATIENT
Start: 2023-12-09 | End: 2023-12-09 | Stop reason: HOSPADM

## 2023-12-09 NOTE — NURSING
Pt hooked up to monitors. Went to grab medications. Returned to room to find that patient was not in room. Looked in lobby. Pt nowhere to be found. Notified Dr. Maradiaga.

## 2023-12-09 NOTE — ED PROVIDER NOTES
Encounter Date: 12/9/2023       History     Chief Complaint   Patient presents with    Abdominal Pain    Nausea    Dizziness     53-year-old male presents to ED with complaints of abdominal pain, nausea, vomiting, dizziness. On interview, patient is angry and will not answer any questions, he tells me that he is tired of answering the same questions in triage and then also having to answer questions. I apologized and said it was necessary that he answer questions in order so I can appropriately care for him, and we are working as fast as we can. He still would not answer any questions, because he had already told two other people (in triage, and the registration) why he came to the ED.    The history is provided by the patient. No  was used.     Review of patient's allergies indicates:  No Known Allergies  No past medical history on file.  No past surgical history on file.  No family history on file.  Social History     Tobacco Use    Smoking status: Every Day     Types: Cigarettes    Smokeless tobacco: Never   Substance Use Topics    Alcohol use: Never    Drug use: Never     Review of Systems   Gastrointestinal:  Positive for abdominal pain, nausea and vomiting.   Neurological:  Positive for dizziness.       Physical Exam     Initial Vitals [12/09/23 1308]   BP Pulse Resp Temp SpO2   (!) 136/100 79 20 98 °F (36.7 °C) 98 %      MAP       --         Physical Exam    Nursing note and vitals reviewed.  Constitutional: He appears well-developed and well-nourished.   HENT:   Head: Normocephalic and atraumatic.   Eyes: Pupils are equal, round, and reactive to light.   Cardiovascular:  Normal rate.           Pulmonary/Chest: No respiratory distress.     Neurological: He is alert and oriented to person, place, and time. GCS score is 15. GCS eye subscore is 4. GCS verbal subscore is 5. GCS motor subscore is 6.         ED Course   Procedures  Labs Reviewed - No data to display         Imaging Results     None          Medications - No data to display  Medical Decision Making  53-year-old male presents to ED with complaints of abdominal pain, nausea, vomiting, dizziness. On interview, patient is angry and will not answer any questions, he tells me that he is tired of answering the same questions in triage and then also having to answer questions. I apologized and said it was necessary that he answer questions in order so I can appropriately care for him, and we are working as fast as we can. He still would not answer any questions, because he had already told two other people (in triage, and the registration) why he came to the ED.  -----------------------  Patient was eloped the ED without discharge instructions, or return precautions discussed after medical screening exam.                                      Clinical Impression:  Final diagnoses:  [R42] Dizziness (Primary)          ED Disposition Condition    Eloped                 Isai Maradiaga MD  12/10/23 7686

## 2024-01-14 ENCOUNTER — HOSPITAL ENCOUNTER (EMERGENCY)
Facility: HOSPITAL | Age: 54
Discharge: LEFT BEFORE DISCHARGE | End: 2024-01-14
Attending: EMERGENCY MEDICINE

## 2024-01-14 VITALS
TEMPERATURE: 98 F | HEIGHT: 64 IN | OXYGEN SATURATION: 100 % | WEIGHT: 160 LBS | SYSTOLIC BLOOD PRESSURE: 132 MMHG | HEART RATE: 78 BPM | RESPIRATION RATE: 16 BRPM | DIASTOLIC BLOOD PRESSURE: 74 MMHG | BODY MASS INDEX: 27.31 KG/M2

## 2024-01-14 DIAGNOSIS — S01.81XA LACERATION OF FOREHEAD, INITIAL ENCOUNTER: Primary | ICD-10-CM

## 2024-01-14 DIAGNOSIS — S09.90XA CLOSED HEAD INJURY, INITIAL ENCOUNTER: ICD-10-CM

## 2024-01-14 PROCEDURE — 12013 RPR F/E/E/N/L/M 2.6-5.0 CM: CPT

## 2024-01-14 PROCEDURE — 99282 EMERGENCY DEPT VISIT SF MDM: CPT
